# Patient Record
Sex: MALE | Race: BLACK OR AFRICAN AMERICAN | NOT HISPANIC OR LATINO | Employment: FULL TIME | URBAN - METROPOLITAN AREA
[De-identification: names, ages, dates, MRNs, and addresses within clinical notes are randomized per-mention and may not be internally consistent; named-entity substitution may affect disease eponyms.]

---

## 2024-01-23 ENCOUNTER — APPOINTMENT (OUTPATIENT)
Dept: GENERAL RADIOLOGY | Facility: CLINIC | Age: 39
End: 2024-01-23
Attending: EMERGENCY MEDICINE

## 2024-01-23 ENCOUNTER — HOSPITAL ENCOUNTER (EMERGENCY)
Facility: CLINIC | Age: 39
Discharge: HOME | End: 2024-01-23
Attending: STUDENT IN AN ORGANIZED HEALTH CARE EDUCATION/TRAINING PROGRAM | Admitting: STUDENT IN AN ORGANIZED HEALTH CARE EDUCATION/TRAINING PROGRAM

## 2024-01-23 ENCOUNTER — APPOINTMENT (OUTPATIENT)
Dept: CT IMAGING | Facility: CLINIC | Age: 39
End: 2024-01-23
Attending: STUDENT IN AN ORGANIZED HEALTH CARE EDUCATION/TRAINING PROGRAM

## 2024-01-23 VITALS
OXYGEN SATURATION: 100 % | HEART RATE: 86 BPM | DIASTOLIC BLOOD PRESSURE: 108 MMHG | TEMPERATURE: 98 F | RESPIRATION RATE: 18 BRPM | SYSTOLIC BLOOD PRESSURE: 147 MMHG

## 2024-01-23 DIAGNOSIS — J20.9 ACUTE BRONCHITIS, UNSPECIFIED ORGANISM: ICD-10-CM

## 2024-01-23 PROBLEM — J45.20 MILD INTERMITTENT ASTHMA WITHOUT COMPLICATION: Chronic | Status: ACTIVE | Noted: 2018-06-25

## 2024-01-23 PROBLEM — F41.1 GAD (GENERALIZED ANXIETY DISORDER): Status: ACTIVE | Noted: 2018-06-25

## 2024-01-23 LAB
ALBUMIN SERPL BCG-MCNC: 4.3 G/DL (ref 3.5–5.2)
ALP SERPL-CCNC: 56 U/L (ref 40–150)
ALT SERPL W P-5'-P-CCNC: 32 U/L (ref 0–70)
ANION GAP SERPL CALCULATED.3IONS-SCNC: 12 MMOL/L (ref 7–15)
AST SERPL W P-5'-P-CCNC: 33 U/L (ref 0–45)
BASOPHILS # BLD AUTO: 0.1 10E3/UL (ref 0–0.2)
BASOPHILS NFR BLD AUTO: 1 %
BILIRUB SERPL-MCNC: 0.5 MG/DL
BUN SERPL-MCNC: 13.3 MG/DL (ref 6–20)
CALCIUM SERPL-MCNC: 9.5 MG/DL (ref 8.6–10)
CHLORIDE SERPL-SCNC: 100 MMOL/L (ref 98–107)
CREAT SERPL-MCNC: 1.03 MG/DL (ref 0.67–1.17)
DEPRECATED HCO3 PLAS-SCNC: 25 MMOL/L (ref 22–29)
EGFRCR SERPLBLD CKD-EPI 2021: >90 ML/MIN/1.73M2
EOSINOPHIL # BLD AUTO: 0.1 10E3/UL (ref 0–0.7)
EOSINOPHIL NFR BLD AUTO: 1 %
ERYTHROCYTE [DISTWIDTH] IN BLOOD BY AUTOMATED COUNT: 13.9 % (ref 10–15)
GLUCOSE SERPL-MCNC: 86 MG/DL (ref 70–99)
HCT VFR BLD AUTO: 49 % (ref 40–53)
HGB BLD-MCNC: 16.8 G/DL (ref 13.3–17.7)
IMM GRANULOCYTES # BLD: 0.1 10E3/UL
IMM GRANULOCYTES NFR BLD: 1 %
LYMPHOCYTES # BLD AUTO: 3 10E3/UL (ref 0.8–5.3)
LYMPHOCYTES NFR BLD AUTO: 28 %
MCH RBC QN AUTO: 29.9 PG (ref 26.5–33)
MCHC RBC AUTO-ENTMCNC: 34.3 G/DL (ref 31.5–36.5)
MCV RBC AUTO: 87 FL (ref 78–100)
MONOCYTES # BLD AUTO: 1.1 10E3/UL (ref 0–1.3)
MONOCYTES NFR BLD AUTO: 11 %
NEUTROPHILS # BLD AUTO: 6.4 10E3/UL (ref 1.6–8.3)
NEUTROPHILS NFR BLD AUTO: 58 %
NRBC # BLD AUTO: 0 10E3/UL
NRBC BLD AUTO-RTO: 0 /100
PLATELET # BLD AUTO: 253 10E3/UL (ref 150–450)
POTASSIUM SERPL-SCNC: 4.2 MMOL/L (ref 3.4–5.3)
PROCALCITONIN SERPL IA-MCNC: 0.07 NG/ML
PROT SERPL-MCNC: 7.7 G/DL (ref 6.4–8.3)
RBC # BLD AUTO: 5.62 10E6/UL (ref 4.4–5.9)
SODIUM SERPL-SCNC: 137 MMOL/L (ref 135–145)
TROPONIN T SERPL HS-MCNC: <6 NG/L
WBC # BLD AUTO: 10.8 10E3/UL (ref 4–11)

## 2024-01-23 PROCEDURE — 71046 X-RAY EXAM CHEST 2 VIEWS: CPT

## 2024-01-23 PROCEDURE — 250N000011 HC RX IP 250 OP 636: Performed by: STUDENT IN AN ORGANIZED HEALTH CARE EDUCATION/TRAINING PROGRAM

## 2024-01-23 PROCEDURE — 84484 ASSAY OF TROPONIN QUANT: CPT | Performed by: EMERGENCY MEDICINE

## 2024-01-23 PROCEDURE — 93005 ELECTROCARDIOGRAM TRACING: CPT

## 2024-01-23 PROCEDURE — 80053 COMPREHEN METABOLIC PANEL: CPT | Performed by: STUDENT IN AN ORGANIZED HEALTH CARE EDUCATION/TRAINING PROGRAM

## 2024-01-23 PROCEDURE — 85025 COMPLETE CBC W/AUTO DIFF WBC: CPT | Performed by: EMERGENCY MEDICINE

## 2024-01-23 PROCEDURE — 99285 EMERGENCY DEPT VISIT HI MDM: CPT | Mod: 25

## 2024-01-23 PROCEDURE — 84484 ASSAY OF TROPONIN QUANT: CPT | Performed by: STUDENT IN AN ORGANIZED HEALTH CARE EDUCATION/TRAINING PROGRAM

## 2024-01-23 PROCEDURE — 36415 COLL VENOUS BLD VENIPUNCTURE: CPT | Performed by: EMERGENCY MEDICINE

## 2024-01-23 PROCEDURE — 82247 BILIRUBIN TOTAL: CPT | Performed by: EMERGENCY MEDICINE

## 2024-01-23 PROCEDURE — 71275 CT ANGIOGRAPHY CHEST: CPT

## 2024-01-23 PROCEDURE — 84145 PROCALCITONIN (PCT): CPT | Performed by: STUDENT IN AN ORGANIZED HEALTH CARE EDUCATION/TRAINING PROGRAM

## 2024-01-23 PROCEDURE — 85025 COMPLETE CBC W/AUTO DIFF WBC: CPT | Performed by: STUDENT IN AN ORGANIZED HEALTH CARE EDUCATION/TRAINING PROGRAM

## 2024-01-23 RX ORDER — AZITHROMYCIN 250 MG/1
TABLET, FILM COATED ORAL
Qty: 6 TABLET | Refills: 0 | Status: SHIPPED | OUTPATIENT
Start: 2024-01-23 | End: 2024-01-28

## 2024-01-23 RX ORDER — LEVALBUTEROL TARTRATE 45 UG/1
2 AEROSOL, METERED ORAL EVERY 4 HOURS PRN
Qty: 15 G | Refills: 0 | Status: SHIPPED | OUTPATIENT
Start: 2024-01-23

## 2024-01-23 RX ORDER — IOPAMIDOL 755 MG/ML
500 INJECTION, SOLUTION INTRAVASCULAR ONCE
Status: COMPLETED | OUTPATIENT
Start: 2024-01-23 | End: 2024-01-23

## 2024-01-23 RX ORDER — PREDNISONE 20 MG/1
40 TABLET ORAL
COMMUNITY
Start: 2024-01-19

## 2024-01-23 RX ORDER — FLUTICASONE PROPIONATE 220 UG/1
1 AEROSOL, METERED RESPIRATORY (INHALATION)
COMMUNITY
Start: 2022-04-08

## 2024-01-23 RX ORDER — BENZONATATE 200 MG/1
200 CAPSULE ORAL
COMMUNITY
Start: 2024-01-19 | End: 2024-01-29

## 2024-01-23 RX ORDER — ALBUTEROL SULFATE 90 UG/1
2 AEROSOL, METERED RESPIRATORY (INHALATION)
COMMUNITY
Start: 2024-01-19 | End: 2024-02-18

## 2024-01-23 RX ADMIN — IOPAMIDOL 67 ML: 755 INJECTION, SOLUTION INTRAVENOUS at 19:03

## 2024-01-23 ASSESSMENT — ACTIVITIES OF DAILY LIVING (ADL)
ADLS_ACUITY_SCORE: 35
ADLS_ACUITY_SCORE: 35

## 2024-01-23 NOTE — Clinical Note
José Farfan was seen and treated in our emergency department on 1/23/2024.  He may return to work on 01/26/2024.       If you have any questions or concerns, please don't hesitate to call.      Armida Salgado, DO

## 2024-01-24 LAB
ATRIAL RATE - MUSE: 79 BPM
DIASTOLIC BLOOD PRESSURE - MUSE: NORMAL MMHG
INTERPRETATION ECG - MUSE: NORMAL
P AXIS - MUSE: 36 DEGREES
PR INTERVAL - MUSE: 150 MS
QRS DURATION - MUSE: 100 MS
QT - MUSE: 362 MS
QTC - MUSE: 415 MS
R AXIS - MUSE: -3 DEGREES
SYSTOLIC BLOOD PRESSURE - MUSE: NORMAL MMHG
T AXIS - MUSE: -13 DEGREES
VENTRICULAR RATE- MUSE: 79 BPM

## 2024-01-24 NOTE — ED PROVIDER NOTES
History     Chief Complaint:  Shortness of Breath and Flu Symptoms    The history is provided by the patient.     José Farfan is a 38 year old male with history of asthma presenting for evaluation of shortness of breath and flu symptoms. José states that he has had a productive cough for the last week and was diagnosed with influenza A on 1/19. He was prescribed Tessalon, albuterol inhaler, and a five day course of prednisone (40 mg), which have not improved his cough. He reports use of albuterol inhaler approximately every hour, adding that his typical inhaler use is only occasionally with weather changes.  He states that he has been wheezing and coughed up some light red blood three days ago. He denies leg pain or swelling. He denies a history of hospitalization for asthma.    Independent Historian:   None - Patient Only    Review of External Notes:   None     Medications:    Albuterol  Tessalon  Deltasone  Imitrex  Zanaflex  Xopenex    Past Medical History:    Mild intermittent asthma without complication  ILENE  MDD  Tobacco use disorder    Physical Exam   Patient Vitals for the past 24 hrs:   BP Temp Temp src Pulse Resp SpO2   01/23/24 2107 (!) 147/108 -- -- 86 -- 100 %   01/23/24 1627 (!) 149/111 -- -- -- -- --   01/23/24 1624 -- 98  F (36.7  C) Temporal 88 18 96 %     Physical Exam  General: Awake, alert, in no acute distress   HEENT: Atraumatic   EOM normal   External ears normal   Trachea midline  Neck: Supple, normal ROM  CV: Regular rate, regular rhythm   No murmur   No lower extremity edema   2+ radial pulses  PULM: Breath sounds normal bilaterally   No rales   Mild end expiratory wheezing throughout  ABD: Soft, non-tender, non-distended   Normal bowel sounds   No rebound or guarding   MSK: No gross deformities  NEURO: Alert, no focal deficits  Skin: Warm, dry and intact    Emergency Department Course   ECG  ECG taken at 1656, ECG read at 1656  Normal sinus rhythm  Nonspecific T wave abnormality    Abnormal ECG  Rate 79 bpm. FL interval 150 ms. QRS duration 100 ms. QT/QTc 362/415 ms. P-R-T axes 36 -3 -13.     Imaging:  CT Chest Pulmonary Embolism w Contrast   Final Result   IMPRESSION:   1.  No evidence for pulmonary emboli.   2.  Mild air trapping. Otherwise no evidence for acute pulmonary disease.      Chest XR,  PA & LAT   Final Result   IMPRESSION: Negative chest.        Laboratory:  Labs Ordered and Resulted from Time of ED Arrival to Time of ED Departure   COMPREHENSIVE METABOLIC PANEL - Normal       Result Value    Sodium 137      Potassium 4.2      Carbon Dioxide (CO2) 25      Anion Gap 12      Urea Nitrogen 13.3      Creatinine 1.03      GFR Estimate >90      Calcium 9.5      Chloride 100      Glucose 86      Alkaline Phosphatase 56      AST 33      ALT 32      Protein Total 7.7      Albumin 4.3      Bilirubin Total 0.5     TROPONIN T, HIGH SENSITIVITY - Normal    Troponin T, High Sensitivity <6     PROCALCITONIN - Normal    Procalcitonin 0.07     CBC WITH PLATELETS AND DIFFERENTIAL    WBC Count 10.8      RBC Count 5.62      Hemoglobin 16.8      Hematocrit 49.0      MCV 87      MCH 29.9      MCHC 34.3      RDW 13.9      Platelet Count 253      % Neutrophils 58      % Lymphocytes 28      % Monocytes 11      % Eosinophils 1      % Basophils 1      % Immature Granulocytes 1      NRBCs per 100 WBC 0      Absolute Neutrophils 6.4      Absolute Lymphocytes 3.0      Absolute Monocytes 1.1      Absolute Eosinophils 0.1      Absolute Basophils 0.1      Absolute Immature Granulocytes 0.1      Absolute NRBCs 0.0       Emergency Department Course & Assessments:       Interventions:  Medications   iopamidol (ISOVUE-370) solution 500 mL (67 mLs Intravenous $Given 1/23/24 1903)   sodium chloride (PF) 0.9% PF flush 100 mL (87 mLs Intravenous $Given 1/23/24 1903)     Assessments:  1810 I obtained history and examined the patient as noted above.  2035 I discussed findings and discharge with the patient. All questions  answered.      Independent Interpretation (X-rays, CTs, rhythm strip):  I reviewed the patient's chest X-ray and note no infiltrates or pneumothorax.    Consultations/Discussion of Management or Tests:  None        Social Determinants of Health affecting care:   None    Disposition:  The patient was discharged to home.     Impression & Plan    Medical Decision Making:  José Farfan is a 38 year old male with a PMH of asthma who presents for evaluation of shortness of breath and wheezing.  He was influenza A positive last week and despite supportive cares including steroid burst for his intermittent asthma he is still quite short of breath.  Broad differential considered including PE, pneumonia, bronchitis, postviral cough.  Workup reassuring with normal WBC, low procalcitonin.  CT scan does not show evidence of pulmonary embolism.      Signs and symptoms are consistent with likely resolving influenza URI and post viral cough although given his history of asthma and air trapping noted on CT scan will treat for bacterial bronchitis with Z-Gregorio.  He prefers to levalbuterol inhaler.  Will prescribe a new one as well as a Z-Gregorio.  Asked him to follow-up with pulmonology for further differentiation of his underlying lung condition.  Possible that his asthma is worse than he realized.  Otherwise follow-up with primary care doctor closely.  No indication for hospitalization as he is not hypoxic nor tachypneic.  Patient can discontinue Tessalon since it is not helping him much.  Instructed him to add mucolytic like Mucinex.  Return precautions provided.  Patient voices understanding and agreement with this plan.  All questions answered.      Diagnosis:    ICD-10-CM    1. Acute bronchitis, unspecified organism  J20.9         Discharge Medications:  Discharge Medication List as of 1/23/2024  9:00 PM        START taking these medications    Details   azithromycin (ZITHROMAX Z-GREGORIO) 250 MG tablet Two tablets on the first day,  then one tablet daily for the next 4 days, Disp-6 tablet, R-0, E-Prescribe      levalbuterol (XOPENEX HFA) 45 MCG/ACT inhaler Inhale 2 puffs into the lungs every 4 hours as needed for shortness of breath or wheezing, Disp-15 g, R-0, E-Prescribe           Scribe Disclosure:  I, Chanelle Pickens, am serving as a scribe at 6:07 PM on 1/23/2024 to document services personally performed by Armida Salgado DO based on my observations and the provider's statements to me.   1/23/2024   Armida Salgado DO Pappas Richter, Ellen, DO  01/24/24 0237